# Patient Record
Sex: FEMALE | Race: BLACK OR AFRICAN AMERICAN | NOT HISPANIC OR LATINO | URBAN - METROPOLITAN AREA
[De-identification: names, ages, dates, MRNs, and addresses within clinical notes are randomized per-mention and may not be internally consistent; named-entity substitution may affect disease eponyms.]

---

## 2020-08-11 ENCOUNTER — OFFICE VISIT (OUTPATIENT)
Dept: URBAN - METROPOLITAN AREA SURGERY CENTER 16 | Facility: SURGERY CENTER | Age: 50
End: 2020-08-11

## 2022-12-12 ENCOUNTER — OFFICE VISIT (OUTPATIENT)
Dept: URBAN - METROPOLITAN AREA CLINIC 124 | Facility: CLINIC | Age: 52
End: 2022-12-12

## 2022-12-12 RX ORDER — LEVOTHYROXINE SODIUM 125 UG/1
TABLET ORAL
Qty: 0 | Refills: 0 | COMMUNITY
Start: 1900-01-01

## 2023-02-06 ENCOUNTER — LAB OUTSIDE AN ENCOUNTER (OUTPATIENT)
Dept: URBAN - METROPOLITAN AREA CLINIC 124 | Facility: CLINIC | Age: 53
End: 2023-02-06

## 2023-02-06 ENCOUNTER — WEB ENCOUNTER (OUTPATIENT)
Dept: URBAN - METROPOLITAN AREA CLINIC 124 | Facility: CLINIC | Age: 53
End: 2023-02-06

## 2023-02-06 ENCOUNTER — OFFICE VISIT (OUTPATIENT)
Dept: URBAN - METROPOLITAN AREA CLINIC 124 | Facility: CLINIC | Age: 53
End: 2023-02-06
Payer: COMMERCIAL

## 2023-02-06 ENCOUNTER — DASHBOARD ENCOUNTERS (OUTPATIENT)
Age: 53
End: 2023-02-06

## 2023-02-06 VITALS
TEMPERATURE: 97.2 F | WEIGHT: 170.8 LBS | BODY MASS INDEX: 30.26 KG/M2 | HEART RATE: 76 BPM | SYSTOLIC BLOOD PRESSURE: 145 MMHG | HEIGHT: 63 IN | DIASTOLIC BLOOD PRESSURE: 86 MMHG

## 2023-02-06 DIAGNOSIS — Z12.11 COLON CANCER SCREENING: ICD-10-CM

## 2023-02-06 DIAGNOSIS — C50.912 MALIGNANT NEOPLASM OF LEFT FEMALE BREAST, UNSPECIFIED ESTROGEN RECEPTOR STATUS, UNSPECIFIED SITE OF BREAST: ICD-10-CM

## 2023-02-06 DIAGNOSIS — I10 HYPERTENSION, UNSPECIFIED TYPE: ICD-10-CM

## 2023-02-06 PROBLEM — 372064008: Status: ACTIVE | Noted: 2023-02-06

## 2023-02-06 PROBLEM — 38341003: Status: ACTIVE | Noted: 2023-02-06

## 2023-02-06 PROCEDURE — 99203 OFFICE O/P NEW LOW 30 MIN: CPT | Performed by: INTERNAL MEDICINE

## 2023-02-06 RX ORDER — ONDANSETRON HYDROCHLORIDE 4 MG/1
1 TABLET AS NEEDED TABLET, FILM COATED ORAL ONCE A DAY
Qty: 2 | Refills: 0 | OUTPATIENT
Start: 2023-02-06

## 2023-02-06 RX ORDER — AMLODIPINE BESYLATE 10 MG/1
1 TABLET TABLET ORAL ONCE A DAY
Status: ACTIVE | COMMUNITY

## 2023-02-06 RX ORDER — SODIUM, POTASSIUM,MAG SULFATES 17.5-3.13G
PLEASE FOLLOW DIRECTIONS FOR TIMING OF TAKING THE DOSES IN SPLIT PREP SOLUTION, RECONSTITUTED, ORAL ORAL AS DIRECTED
Qty: 177 MILLILITER | Refills: 0 | OUTPATIENT
Start: 2023-02-06 | End: 2023-02-07

## 2023-02-06 RX ORDER — LEVOTHYROXINE SODIUM 125 UG/1
TABLET ORAL
Qty: 0 | Refills: 0 | Status: ACTIVE | COMMUNITY
Start: 1900-01-01

## 2023-02-06 NOTE — HPI-TODAY'S VISIT:
The is a 52-year-old female referred by Dr. Adams for GI consultation for colon cancer screening and a copy will be sent to the referring provider.  Patient was seen in 2019 at which time she wanted to set up a colonoscopy.  It does not appear that she was able to do the procedure due to COVID issues.  She now returns to set up the exam.  Pt goes to bathroom twice a day. Pt was dx with breast cancer last year. Pt is doing radiation therapy now. Lumpectomy. Pt sees an oncologist.

## 2023-05-02 ENCOUNTER — OFFICE VISIT (OUTPATIENT)
Dept: URBAN - METROPOLITAN AREA SURGERY CENTER 16 | Facility: SURGERY CENTER | Age: 53
End: 2023-05-02
Payer: COMMERCIAL

## 2023-05-02 DIAGNOSIS — K63.5 BENIGN COLON POLYP: ICD-10-CM

## 2023-05-02 DIAGNOSIS — Z12.11 AVERAGE RISK FOR CRC. DUE TO PT'S CO-MORBID STATE WITH END STAGE DEMENTIA, HIGH RISK FOR ANESTHESIA (PER NEUROLOGY); INABILITY TO TAKE A BOWEL PREP....WOULD NOT ADVISE ANY COLORECTAL CANCER SCREENING INCLUDING STOOL TEST FOR FECAL BLOOD.: ICD-10-CM

## 2023-05-02 PROCEDURE — 45380 COLONOSCOPY AND BIOPSY: CPT | Performed by: INTERNAL MEDICINE

## 2023-05-02 PROCEDURE — G8907 PT DOC NO EVENTS ON DISCHARG: HCPCS | Performed by: INTERNAL MEDICINE
